# Patient Record
Sex: FEMALE | Race: BLACK OR AFRICAN AMERICAN | URBAN - METROPOLITAN AREA
[De-identification: names, ages, dates, MRNs, and addresses within clinical notes are randomized per-mention and may not be internally consistent; named-entity substitution may affect disease eponyms.]

---

## 2020-02-23 ENCOUNTER — EMERGENCY (EMERGENCY)
Facility: HOSPITAL | Age: 40
LOS: 1 days | Discharge: HOME | End: 2020-02-23
Admitting: EMERGENCY MEDICINE
Payer: OTHER MISCELLANEOUS

## 2020-02-23 VITALS
HEART RATE: 99 BPM | OXYGEN SATURATION: 99 % | WEIGHT: 250 LBS | SYSTOLIC BLOOD PRESSURE: 146 MMHG | RESPIRATION RATE: 17 BRPM | TEMPERATURE: 98 F | DIASTOLIC BLOOD PRESSURE: 82 MMHG

## 2020-02-23 LAB
ALBUMIN SERPL ELPH-MCNC: 4.4 G/DL — SIGNIFICANT CHANGE UP (ref 3.3–5)
ALP SERPL-CCNC: 60 U/L — SIGNIFICANT CHANGE UP (ref 40–120)
ALT FLD-CCNC: 12 U/L — SIGNIFICANT CHANGE UP (ref 10–45)
ANION GAP SERPL CALC-SCNC: 11 MMOL/L — SIGNIFICANT CHANGE UP (ref 5–17)
AST SERPL-CCNC: 15 U/L — SIGNIFICANT CHANGE UP (ref 10–40)
BASOPHILS # BLD AUTO: 0.06 K/UL — SIGNIFICANT CHANGE UP (ref 0–0.2)
BASOPHILS NFR BLD AUTO: 1.2 % — SIGNIFICANT CHANGE UP (ref 0–2)
BILIRUB SERPL-MCNC: <0.2 MG/DL — SIGNIFICANT CHANGE UP (ref 0.2–1.2)
BUN SERPL-MCNC: 9 MG/DL — SIGNIFICANT CHANGE UP (ref 7–23)
CALCIUM SERPL-MCNC: 10.3 MG/DL — SIGNIFICANT CHANGE UP (ref 8.4–10.5)
CHLORIDE SERPL-SCNC: 102 MMOL/L — SIGNIFICANT CHANGE UP (ref 96–108)
CO2 SERPL-SCNC: 27 MMOL/L — SIGNIFICANT CHANGE UP (ref 22–31)
CREAT SERPL-MCNC: 0.67 MG/DL — SIGNIFICANT CHANGE UP (ref 0.5–1.3)
EOSINOPHIL # BLD AUTO: 0.27 K/UL — SIGNIFICANT CHANGE UP (ref 0–0.5)
EOSINOPHIL NFR BLD AUTO: 5.4 % — SIGNIFICANT CHANGE UP (ref 0–6)
GLUCOSE SERPL-MCNC: 91 MG/DL — SIGNIFICANT CHANGE UP (ref 70–99)
HAV IGM SER-ACNC: SIGNIFICANT CHANGE UP
HBV CORE IGM SER-ACNC: SIGNIFICANT CHANGE UP
HBV SURFACE AG SER-ACNC: SIGNIFICANT CHANGE UP
HCT VFR BLD CALC: 31.6 % — LOW (ref 34.5–45)
HCV AB S/CO SERPL IA: 0.08 S/CO — SIGNIFICANT CHANGE UP
HCV AB SERPL-IMP: SIGNIFICANT CHANGE UP
HGB BLD-MCNC: 9.1 G/DL — LOW (ref 11.5–15.5)
HIV 1+2 AB+HIV1 P24 AG SERPL QL IA: SIGNIFICANT CHANGE UP
IMM GRANULOCYTES NFR BLD AUTO: 0.2 % — SIGNIFICANT CHANGE UP (ref 0–1.5)
LYMPHOCYTES # BLD AUTO: 1.31 K/UL — SIGNIFICANT CHANGE UP (ref 1–3.3)
LYMPHOCYTES # BLD AUTO: 26 % — SIGNIFICANT CHANGE UP (ref 13–44)
MCHC RBC-ENTMCNC: 23.3 PG — LOW (ref 27–34)
MCHC RBC-ENTMCNC: 28.8 GM/DL — LOW (ref 32–36)
MCV RBC AUTO: 81 FL — SIGNIFICANT CHANGE UP (ref 80–100)
MONOCYTES # BLD AUTO: 0.41 K/UL — SIGNIFICANT CHANGE UP (ref 0–0.9)
MONOCYTES NFR BLD AUTO: 8.2 % — SIGNIFICANT CHANGE UP (ref 2–14)
NEUTROPHILS # BLD AUTO: 2.97 K/UL — SIGNIFICANT CHANGE UP (ref 1.8–7.4)
NEUTROPHILS NFR BLD AUTO: 59 % — SIGNIFICANT CHANGE UP (ref 43–77)
NRBC # BLD: 0 /100 WBCS — SIGNIFICANT CHANGE UP (ref 0–0)
PLATELET # BLD AUTO: 561 K/UL — HIGH (ref 150–400)
POTASSIUM SERPL-MCNC: 3.6 MMOL/L — SIGNIFICANT CHANGE UP (ref 3.5–5.3)
POTASSIUM SERPL-SCNC: 3.6 MMOL/L — SIGNIFICANT CHANGE UP (ref 3.5–5.3)
PROT SERPL-MCNC: 7.4 G/DL — SIGNIFICANT CHANGE UP (ref 6–8.3)
RBC # BLD: 3.9 M/UL — SIGNIFICANT CHANGE UP (ref 3.8–5.2)
RBC # FLD: 17.4 % — HIGH (ref 10.3–14.5)
SODIUM SERPL-SCNC: 140 MMOL/L — SIGNIFICANT CHANGE UP (ref 135–145)
WBC # BLD: 5.03 K/UL — SIGNIFICANT CHANGE UP (ref 3.8–10.5)
WBC # FLD AUTO: 5.03 K/UL — SIGNIFICANT CHANGE UP (ref 3.8–10.5)

## 2020-02-23 PROCEDURE — 99284 EMERGENCY DEPT VISIT MOD MDM: CPT

## 2020-02-23 PROCEDURE — 36415 COLL VENOUS BLD VENIPUNCTURE: CPT

## 2020-02-23 PROCEDURE — 85025 COMPLETE CBC W/AUTO DIFF WBC: CPT

## 2020-02-23 PROCEDURE — 80053 COMPREHEN METABOLIC PANEL: CPT

## 2020-02-23 PROCEDURE — 87389 HIV-1 AG W/HIV-1&-2 AB AG IA: CPT

## 2020-02-23 PROCEDURE — 80074 ACUTE HEPATITIS PANEL: CPT

## 2020-02-23 PROCEDURE — 99283 EMERGENCY DEPT VISIT LOW MDM: CPT

## 2020-02-23 RX ORDER — EMTRICITABINE AND TENOFOVIR DISOPROXIL FUMARATE 200; 300 MG/1; MG/1
1 TABLET, FILM COATED ORAL
Qty: 21 | Refills: 0
Start: 2020-02-23 | End: 2020-03-14

## 2020-02-23 RX ORDER — DIPHENHYDRAMINE HYDROCHLORIDE AND LIDOCAINE HYDROCHLORIDE AND ALUMINUM HYDROXIDE AND MAGNESIUM HYDRO
10 KIT ONCE
Refills: 0 | Status: COMPLETED | OUTPATIENT
Start: 2020-02-23 | End: 2020-02-23

## 2020-02-23 RX ORDER — RALTEGRAVIR 400 MG/1
1 TABLET, FILM COATED ORAL
Qty: 42 | Refills: 0
Start: 2020-02-23 | End: 2020-03-14

## 2020-02-23 RX ADMIN — DIPHENHYDRAMINE HYDROCHLORIDE AND LIDOCAINE HYDROCHLORIDE AND ALUMINUM HYDROXIDE AND MAGNESIUM HYDRO 10 MILLILITER(S): KIT at 11:35

## 2020-02-23 NOTE — ED PROVIDER NOTE - NSFOLLOWUPINSTRUCTIONS_ED_ALL_ED_FT
Postexposure Prophylaxis    WHAT YOU NEED TO KNOW:    Postexposure prophylaxis (PEP) is medical care given to prevent HIV, hepatitis B, and other diseases. PEP may include first aid, testing, and medicines. Exposure can occur when you have contact with certain body fluids from another person. These fluids include blood, semen, and vaginal fluid. They also include any other body fluid that may have blood in it, such as saliva or urine. You are exposed if these fluids touch an open area of your skin, such as a cut. You also are exposed if the fluids touch a mucus membrane. This is a moist area, such as in the eyes, nose, and mouth. You can be exposed by a needlestick through the skin.     DISCHARGE INSTRUCTIONS:    Medicines:     Antiretrovirals: These medicines help prevent HIV. They must be taken for 28 days, unless your healthcare provider tells you otherwise. You may be given a starter pack with enough medicine for 1 to 7 days. You may be given medicine for the full 28 days instead. If you receive a starter pack, you must return to your healthcare provider in 1 to 7 days. At this visit, you will receive the rest of the medicine.      Hepatitis B vaccine: This medicine helps prevent hepatitis B. You will need 3 doses (shots) of the vaccine. The second dose should be taken 1 to 2 months after the first dose. The third dose should be taken 4 to 6 months after the first dose.      Antibiotics: These are germ-killing medicines to help treat or prevent sexually transmitted diseases, such as gonorrhea.      Take your medicine as directed. Contact your healthcare provider if you think your medicine is not helping or if you have side effects. Tell him or her if you are allergic to any medicine. Keep a list of the medicines, vitamins, and herbs you take. Include the amounts, and when and why you take them. Bring the list or the pill bottles to follow-up visits. Carry your medicine list with you in case of an emergency.    Follow up with your healthcare provider as directed: If you did not receive any treatment after the exposure, you will need to follow up with your healthcare provider within 1 week. If you were given antiretrovirals, you will need a follow-up visit in about 2 weeks. Further HIV testing will be needed 6 weeks, 3 months, and 6 months after the exposure. You may have HIV testing up to 12 months after the exposure.    Precautions: In case you are infected, you will need to take steps to keep others from getting sick. These steps can help you avoid being exposed again:     Avoid sex, or have safe sex. Safe sex means having sex only with one person who is not infected and who is only having sex with you. It also means using condoms each time you have sex.       Avoid injection drug use. If you cannot do this, always use needles that are sterile (germ-free).       Follow all safety rules at your workplace if you are at risk of exposure. Be sure to use safety equipment as needed.       Get the hepatitis B vaccine if you have not already.       Do not breastfeed unless you know you are not infected.     In case of future exposure: If you think you have been exposed, get first aid right away. If you are at work, follow work policy to report the exposure. The type of first aid you need depends on what part of your body was exposed:     Open skin: Wash the area right away with soap and water. Use a gel hand  if you do not have soap or running water. Do not use anything harsh, such as bleach. Do not squeeze or rub the skin.       Eyes: Rinse your eyes with water or saline (a salt solution) right away. Be sure to clean well by moving your eyelids with your fingers as you rinse. Keep contact lenses in while rinsing your eyes, then remove and clean them as usual. Avoid soap or other .       Mouth: Spit out the blood or body fluid right away. Rinse your mouth with water or saline several times. Avoid putting soap or other  in your mouth.     For support and more information: It can be hard to cope if you think you have been exposed to a disease. You may feel scared and concerned about your health. This is normal. It can be helpful to find out all you can about the risk of exposure. Counseling or joining a support group also can help. Talk to your healthcare provider, or contact the following:     National Center for HIV/AIDS, Viral Hepatitis, STD, and TB Prevention, CDC  Web Address: www.cdc.gov/nchhstp/      The National Clinicians’ Post-Exposure Prophylaxis Hotline  Web Address: www.ncc.[facility].edu/about_nccc/pepline/      Contact your healthcare provider if:     You have nausea or diarrhea.      You are more tired than usual.       You have new headaches, or you feel dizzy.       You have trouble sleeping.       Your eyes or skin turn yellow.       You are not eating because of appetite loss.       You are or may be pregnant.     Return to the emergency department if:     You have a fever.       You have a rash.       You have new muscle pain or pain in your back or abdomen.       You urinate more often than usual, have blood in your urine, or have pain while urinating.       You are more thirsty than usual.       You have trouble swallowing or breathing.
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
## 2020-02-23 NOTE — ED PROVIDER NOTE - PATIENT PORTAL LINK FT
You can access the FollowMyHealth Patient Portal offered by [facility] by registering at the following website: [email or web address]. By joining [employer]’s FollowMyHealth portal, you will also be able to view your health information using other applications (apps) compatible with our system.
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
## 2020-02-28 DIAGNOSIS — R51 HEADACHE: ICD-10-CM

## 2020-02-28 DIAGNOSIS — Z77.21 CONTACT WITH AND (SUSPECTED) EXPOSURE TO POTENTIALLY HAZARDOUS BODY FLUIDS: ICD-10-CM

## 2024-11-04 NOTE — ED ADULT NURSE NOTE - NSFALLRSKASSESSDT_ED_ALL_ED
DVT Prophylaxis Adjustment Policy (DVT Prophylaxis)     This patient is on DVT Prophylaxis medication that requires a dose adjustment      Date Body Weight IBW  Adjusted BW SCr  CrCl Dialysis status   11/4/2024 74 kg (163 lb 2.3 oz) Ideal body weight: 66.1 kg (145 lb 11.6 oz)  Adjusted ideal body weight: 69.3 kg (152 lb 11 oz) Serum creatinine: 0.7 mg/dL 11/03/24 1624  Estimated creatinine clearance: 125 mL/min N/a       Pharmacy has dose-adjusted the DVT Prophylaxis regimen to match   the recommendations from the following table        Ordered Medication:Lovenox 30mg daily    Order Changed/converted to: Lovenox 40mg daily      These changes were made per protocol according to the [facility] Pharmacist   Review for Appropriate Use and Automatic Dose Adjustments of   Subcutaneous Anticoagulants Policy     *Please note this dose may need readjusted if patient's condition changes.    Please contact pharmacy with any questions regarding these changes.    Lyle Almendarez, PharmD   11/4/2024  12:16 AM    23-Feb-2020 10:33